# Patient Record
Sex: MALE | Race: WHITE | NOT HISPANIC OR LATINO | URBAN - METROPOLITAN AREA
[De-identification: names, ages, dates, MRNs, and addresses within clinical notes are randomized per-mention and may not be internally consistent; named-entity substitution may affect disease eponyms.]

---

## 2018-07-10 ENCOUNTER — AMBULATORY SURGICAL CENTER (OUTPATIENT)
Dept: URBAN - METROPOLITAN AREA SURGERY 20 | Facility: SURGERY | Age: 63
End: 2018-07-10
Payer: MEDICARE

## 2018-07-10 DIAGNOSIS — Z12.11 ENCOUNTER FOR SCREENING FOR MALIGNANT NEOPLASM OF COLON: ICD-10-CM

## 2018-07-10 DIAGNOSIS — K57.30 DIVERTICULOSIS OF LARGE INTESTINE WITHOUT PERFORATION OR ABS: ICD-10-CM

## 2018-07-10 PROCEDURE — G0121 COLON CA SCRN NOT HI RSK IND: HCPCS

## 2024-05-09 ENCOUNTER — OFFICE VISIT (OUTPATIENT)
Age: 69
End: 2024-05-09
Payer: MEDICARE

## 2024-05-09 VITALS
OXYGEN SATURATION: 97 % | BODY MASS INDEX: 28.71 KG/M2 | WEIGHT: 212 LBS | HEART RATE: 47 BPM | SYSTOLIC BLOOD PRESSURE: 148 MMHG | DIASTOLIC BLOOD PRESSURE: 90 MMHG | HEIGHT: 72 IN

## 2024-05-09 DIAGNOSIS — I71.21 ANEURYSM OF ASCENDING AORTA WITHOUT RUPTURE: ICD-10-CM

## 2024-05-09 DIAGNOSIS — I35.1 NONRHEUMATIC AORTIC VALVE INSUFFICIENCY: ICD-10-CM

## 2024-05-09 DIAGNOSIS — I35.0 NONRHEUMATIC AORTIC VALVE STENOSIS: Primary | ICD-10-CM

## 2024-05-09 DIAGNOSIS — I48.0 PAROXYSMAL ATRIAL FIBRILLATION: ICD-10-CM

## 2024-05-09 PROCEDURE — 99204 OFFICE O/P NEW MOD 45 MIN: CPT | Performed by: INTERNAL MEDICINE

## 2024-05-09 PROCEDURE — 93000 ELECTROCARDIOGRAM COMPLETE: CPT | Performed by: INTERNAL MEDICINE

## 2024-05-09 RX ORDER — METOPROLOL SUCCINATE 50 MG/1
50 TABLET, EXTENDED RELEASE ORAL
COMMUNITY
Start: 2024-03-25 | End: 2025-03-25

## 2024-05-09 RX ORDER — LOSARTAN POTASSIUM 100 MG/1
100 TABLET ORAL DAILY
Qty: 90 TABLET | Refills: 3 | Status: SHIPPED | OUTPATIENT
Start: 2024-05-09

## 2024-05-09 RX ORDER — LOSARTAN POTASSIUM 50 MG/1
50 TABLET ORAL DAILY
COMMUNITY
End: 2024-05-09 | Stop reason: SDUPTHER

## 2024-05-09 RX ORDER — ATORVASTATIN CALCIUM 80 MG/1
80 TABLET, FILM COATED ORAL DAILY
COMMUNITY

## 2024-05-09 RX ORDER — AMLODIPINE BESYLATE 5 MG/1
5 TABLET ORAL DAILY
COMMUNITY
Start: 2024-01-17 | End: 2025-01-18

## 2024-06-04 ENCOUNTER — TELEPHONE (OUTPATIENT)
Age: 69
End: 2024-06-04
Payer: MEDICARE

## 2024-06-11 RX ORDER — AMLODIPINE BESYLATE 2.5 MG/1
2.5 TABLET ORAL DAILY
Qty: 90 TABLET | Refills: 4 | Status: SHIPPED | OUTPATIENT
Start: 2024-06-11

## 2024-06-11 RX ORDER — METOPROLOL SUCCINATE 50 MG/1
50 TABLET, EXTENDED RELEASE ORAL 2 TIMES DAILY
Qty: 180 TABLET | Refills: 4 | Status: SHIPPED | OUTPATIENT
Start: 2024-06-11

## 2024-06-11 NOTE — TELEPHONE ENCOUNTER
Patient stopped by and I updated his list accordingly. His amlodipine was actually 2.5mg daily instead of 5mg daily and he was taking his metoprolol succ 50mg as twice daily instead of daily.    He wanted me to pass along that since he saw you on 5/9/24, that his BP has been running 120-130s/80s and HR 44-50 at home.    On 5/9/24 you increased his Losartan from 50mg daily to 100mg daily.    Please advise if you want to make any further medication changes.    Thanks,  Tayler

## 2024-08-05 ENCOUNTER — TELEPHONE (OUTPATIENT)
Dept: CARDIOLOGY | Facility: CLINIC | Age: 69
End: 2024-08-05
Payer: MEDICARE

## 2024-08-05 NOTE — TELEPHONE ENCOUNTER
Caller Name: Aldair Reich      Relationship: Self      Best Contact Number: [phone]      Patient is requesting samples of apixaban (ELIQUIS) 5 MG tablet tablet       How many days of medication do you have left? LITTLE LESS THAN A WEEK        Additional Information: PATIENT STATES [facility] IS HAVING A PROCEDURE TODAY AND CAN PICK THEM UP IF ANY AVAILABLE.

## 2024-09-09 ENCOUNTER — TELEPHONE (OUTPATIENT)
Dept: CARDIOLOGY | Facility: CLINIC | Age: 69
End: 2024-09-09
Payer: MEDICARE

## 2024-09-09 NOTE — TELEPHONE ENCOUNTER
Caller: KAILEY KILGORE    Relationship: [facility]    Best call back number: PLEASE CALL PATIENT [phone]        What was the call regarding: RECEIVED THE PROVIDER PORTION OF THE ASSISTANCE FORMS BUT THEY ARE STILL MISSING THE PATIENT SECTION    FAX: [phone]

## 2024-09-10 NOTE — TELEPHONE ENCOUNTER
I have received the fax from Perla England and scanned it into the chart.    I will reach out to the patient Friday when Dr. Mckeon is not in clinic and see if he is still interested.    Tayler

## 2024-11-20 ENCOUNTER — OFFICE VISIT (OUTPATIENT)
Age: 69
End: 2024-11-20
Payer: MEDICARE

## 2024-11-20 VITALS
BODY MASS INDEX: 29.8 KG/M2 | WEIGHT: 220 LBS | HEIGHT: 72 IN | DIASTOLIC BLOOD PRESSURE: 80 MMHG | SYSTOLIC BLOOD PRESSURE: 128 MMHG | HEART RATE: 55 BPM

## 2024-11-20 DIAGNOSIS — I35.0 NONRHEUMATIC AORTIC VALVE STENOSIS: Primary | ICD-10-CM

## 2024-11-20 DIAGNOSIS — I48.0 PAROXYSMAL ATRIAL FIBRILLATION: ICD-10-CM

## 2024-11-20 DIAGNOSIS — I35.1 NONRHEUMATIC AORTIC VALVE INSUFFICIENCY: ICD-10-CM

## 2024-11-20 DIAGNOSIS — I71.21 ANEURYSM OF ASCENDING AORTA WITHOUT RUPTURE: ICD-10-CM

## 2024-11-20 PROCEDURE — 99214 OFFICE O/P EST MOD 30 MIN: CPT | Performed by: INTERNAL MEDICINE

## 2024-11-20 PROCEDURE — 93000 ELECTROCARDIOGRAM COMPLETE: CPT | Performed by: INTERNAL MEDICINE

## 2024-11-20 NOTE — PROGRESS NOTES
Aldair Damir  1955  Date of Office Visit: 11/20/24  Encounter Provider: Eder Mckeon MD  Place of Service: [facility]IOLOG[facility]      CHIEF COMPLAINT:  Ascending aortic aneurysm  Coronary artery disease  Paroxysmal atrial fibrillation    HISTORY OF PRESENT ILLNESS:  69-year-old male with past medical history hypertension, hypercholesterolemia, TIA, and prediabetes, who previously was documented on stress test to have a normal nuclear scan but evidence of PVCs and nonsustained ventricular tachycardia.  He subsequently underwent evaluation including an event monitor showing paroxysmal atrial fibrillation with a rapid ventricular rate and in addition to that a PVC burden of 5%.  He did have nonsustained ventricular tachycardia with a 13 beat run.  He subsequently was set up for transthoracic echocardiogram which showed him to have normal left ventricular size and systolic function with mild aortic valve stenosis and mild to moderate aortic valve regurgitation.  He was also noted to have mild dilation of the aortic root and ascending aorta.  This was followed up on 2/20/2024 with a CTA of the chest abdomen.  He was noted to have a mildly dilated ascending aorta at 4.1 cm and mild dilation of the abdominal aorta at 3.1 cm.  He was subsequently started on Eliquis therapy for his paroxysmal atrial fibrillation and his Toprol was increased.  He has been doing well with that.  He denies any chest pain or dyspnea on exertion.  Secondary to his nonsustained ventricular tachycardia he was also scheduled for a cardiac catheterization.  He had a mid LAD stenosis of 75% and an apical LAD stenosis that was around 90%.  He states that at the time of his catheterization he was told that he needed a CTA, however he previously had this. Of note he does have a son who had an aortic valve replacement at age 21 and then had a TAVR more recently with Dr. Anthony.  He has been doing well  physically as of late.  He denies any dyspnea exertion, chest pain or orthopnea.  He has had no lightheadedness or syncope in the setting of his mild sinus bradycardia.  Unfortunately does suffer the loss of his brother with what sounds like an MI.        Review of Systems   Constitutional: Negative for fever and malaise/fatigue.   HENT:  Negative for nosebleeds and sore throat.    Eyes:  Negative for blurred vision and double vision.   Cardiovascular:  Negative for chest pain, claudication, palpitations and syncope.   Respiratory:  Negative for cough, shortness of breath and snoring.    Endocrine: Negative for cold intolerance, heat intolerance and polydipsia.   Skin:  Negative for itching, poor wound healing and rash.   Musculoskeletal:  Negative for joint pain, joint swelling, muscle weakness and myalgias.   Gastrointestinal:  Negative for abdominal pain, melena, nausea and vomiting.   Neurological:  Negative for light-headedness, loss of balance, seizures, vertigo and weakness.   Psychiatric/Behavioral:  Negative for altered mental status and depression.          Past Medical History:   Diagnosis Date    Abnormal ECG 02/2024    Afib and Vtac    Aneurysm 02/2024    Aortic aneurism    Arrhythmia 02/2024    Afib & Vtac    Atrial fibrillation 02/2024    Coronary artery disease 02/2024    Mild aortic stenosis    Heart valve disease 02/2024    Calcified aortic valve    Hyperlipidemia 02/2016    Hypertension 02/2024    150/90       The following portions of the patient's history were reviewed and updated as appropriate: Social history , Family history, and Surgical history     Current Outpatient Medications on File Prior to Visit   Medication Sig Dispense Refill    amLODIPine (NORVASC) 2.5 MG tablet Take 1 tablet by mouth Daily. 90 tablet 4    apixaban (ELIQUIS) 5 MG tablet tablet Take 1 tablet by mouth 2 (Two) Times a Day. 28 tablet 0    aspirin 81 MG oral suspension       atorvastatin (LIPITOR) 80 MG tablet Take 1  "tablet by mouth Daily.      losartan (COZAAR) 100 MG tablet Take 1 tablet by mouth Daily. 90 tablet 3    metoprolol succinate XL (TOPROL-XL) 50 MG 24 hr tablet Take 1 tablet by mouth 2 (Two) Times a Day. 180 tablet 4     No current facility-administered medications on file prior to visit.       Allergies   Allergen Reactions    Penicillins Irritability     Last time pt had PCN was when he was 17. He said he turned red.       Vitals:    11/20/24 0941   BP: 128/80   Pulse: 55   Weight: 99.8 kg (220 lb)   Height: 182.9 cm (72\")     Body mass index is 29.84 kg/m².   Constitutional:       Appearance: Well-developed.   Eyes:      General: No scleral icterus.     Conjunctiva/sclera: Conjunctivae normal.   HENT:      Head: Normocephalic and atraumatic.   Neck:      Thyroid: No thyromegaly.      Vascular: Normal carotid pulses. No carotid bruit, hepatojugular reflux or JVD.      Trachea: No tracheal deviation.   Pulmonary:      Effort: No respiratory distress.      Breath sounds: Normal breath sounds. No decreased breath sounds. No wheezing. No rhonchi. No rales.   Chest:      Chest wall: Not tender to palpatation.   Cardiovascular:      Bradycardia present. Regular rhythm.      No gallop.    Pulses:     Carotid: 2+ bilaterally.     Radial: 2+ bilaterally.     Femoral: 2+ bilaterally.     Dorsalis pedis: 2+ bilaterally.     Posterior tibial: 2+ bilaterally.  Edema:     Peripheral edema absent.   Abdominal:      General: Bowel sounds are normal. There is no distension.      Palpations: Abdomen is soft.      Tenderness: There is no abdominal tenderness.   Musculoskeletal:         General: No deformity.      Cervical back: Normal range of motion and neck supple. Skin:     Findings: No erythema or rash.   Neurological:      Mental Status: Alert and oriented to person, place, and time.      Sensory: No sensory deficit.   Psychiatric:         Behavior: Behavior normal.           Lab Results   Component Value Date    WBC 5.65 " 10/04/2021    HGB 16.3 10/04/2021    HCT 50.2 10/04/2021    MCV 81.4 10/04/2021     10/04/2021       Lab Results   Component Value Date    BUN 17 10/04/2021    CREATININE 1.11 10/04/2021    BCR 15.3 10/04/2021    K 4.0 10/04/2021    CO2 19 (L) 10/04/2021    CALCIUM 9.3 10/04/2021    ALBUMIN 4.2 10/04/2021    BILITOT 0.7 10/04/2021    AST 22 10/04/2021    ALT 34 10/04/2021       Lab Results   Component Value Date    CALCIUM 9.3 10/04/2021     10/04/2021    K 4.0 10/04/2021    CO2 19 (L) 10/04/2021     (H) 10/04/2021    BUN 17 10/04/2021    CREATININE 1.11 10/04/2021    BCR 15.3 10/04/2021    ANIONGAP 11 10/04/2021       Lab Results   Component Value Date    CHLPL 106 02/28/2024    TRIG 105 02/28/2024    HDL 41 02/28/2024         ECG 12 Lead    Date/Time: 11/20/2024 10:51 AM  Performed by: Eder Mckeon MD    Authorized by: Eder Mckeon MD  Comparison: compared with previous ECG from 5/9/2024  Similar to previous ECG  Rhythm: sinus bradycardia  Rate: bradycardic  Conduction: 1st degree AV block  QRS axis: normal           Cardiac catheterization done on 3/1/2024 shows:  1. Mild aortic stenosis aortic valve area 3.0 cm²  2. Moderate to severe aortic regurgitation  3. Aneurysmal dilatation of ascending aorta  4. Normal right heart pressures  5. Left interior descending artery 75% mid section stenosis, 90% distal one third stenosis near the apex  6. 40-50% stenosis first marginal branch of circumflex    2/20/24 CTA abdomen  IMPRESSION:   Mildly aneurysmal abdominal aorta measuring 3.1 cm.     2/20/24 CTA chest   IMPRESSION:   1.  Mildly dilated thoracic aorta measuring up to 4.1 cm at the ascending tubular segment.   2.  Incidental and chronic appearing findings as above.       January 2024:TTE  LVEF of 58%, mild concentric left ventricular hypertrophy, grade 1 diastolic dysfunction, mildly dilated left atrium, mildly dilated right ventricle, mild aortic stenosis, mild to moderate  aortic regurgitation, mild mitral regurgitation, mild tricuspid regurgitation, mild dilatation of the aortic root and the ascending aorta.    1/2024: Event monitor  Normal sinus rhythm, no pauses, 4274 runs of atrial fibrillation with rapid ventricular response, longest A-fib run was 42 beats. PVC burden of 5.04%. 761 runs of ventricular tachycardia. Longest VT run was a 13 beat run of ventricular tachycardia.    DISCUSSION/SUMMARY  69-year-old with a medical history of ascending aortic aneurysm measuring 4.1 cm, mild aortic valve stenosis, mild to moderate aortic valve regurgitation, paroxysmal atrial fibrillation, PVCs and nonsustained ventricular tachycardia, mildly dilated abdominal aorta, and coronary artery disease who presents to me in follow-up.  He is doing very well as of late.  He denies any chest pain or dyspnea on exertion.  Blood pressure is better controlled as of late.  He does have a mild sinus bradycardia but is asymptomatic with that.    1.  Ascending aortic aneurysm/mildly dilated abdominal aorta  - I would plan on a repeat CTA on next visit.  - Blood pressure control looks improved.    2.  Paroxysmal atrial fibrillation: Seems to be doing well and is asymptomatic.  Continue apixaban 5 mg p.o. every 12 hours.  Tolerating well with no bleeding complications.  - Continue Toprol 50 mg p.o. every 12 hours.  Mild sinus bradycardia but asymptomatic    3.  Coronary artery disease: Previously documented on reported to have a 75% mid LAD stenosis and apical 90% stenosis.  No indication for intervention at this time.  He seems to be doing fine on medical management.    4.  Essential hypertension: Improved compared to last visit.

## 2025-01-08 ENCOUNTER — TELEPHONE (OUTPATIENT)
Dept: CARDIOLOGY | Facility: CLINIC | Age: 70
End: 2025-01-08
Payer: MEDICARE

## 2025-01-08 NOTE — TELEPHONE ENCOUNTER
Caller Name: Aldair Reich      Relationship: Self      Best Contact Number: [phone]      Patient is requesting samples of apixaban (ELIQUIS) 5 MG tablet tablet       How many days of medication do you have left? ENOUGH FOR TODAY         Additional Information: WIFE WILL  SAMPLES IF ANY ARE AVAILABLE. CALL IF NO SAMPLES ARE AVAILABLE.

## 2025-03-26 ENCOUNTER — TELEPHONE (OUTPATIENT)
Dept: CARDIOLOGY | Age: 70
End: 2025-03-26

## 2025-03-26 NOTE — TELEPHONE ENCOUNTER
Caller Name: Aldair Reich      Relationship: Self      Best Contact Number: [phone]      Patient is requesting samples of: ELIQUIS 5MG

## 2025-04-21 ENCOUNTER — TELEPHONE (OUTPATIENT)
Dept: CARDIOLOGY | Age: 70
End: 2025-04-21
Payer: MEDICARE

## 2025-04-21 NOTE — TELEPHONE ENCOUNTER
Caller: Aldair Reich    Relationship: Self    Best call back number:       PATIENTS IS REQUESTING SAMPLES OF ELIQUIS

## 2025-05-20 RX ORDER — LOSARTAN POTASSIUM 100 MG/1
100 TABLET ORAL DAILY
Qty: 90 TABLET | Refills: 3 | Status: SHIPPED | OUTPATIENT
Start: 2025-05-20

## 2025-06-02 ENCOUNTER — LAB (OUTPATIENT)
Dept: LAB | Facility: HOSPITAL | Age: 70
End: 2025-06-02
Payer: MEDICARE

## 2025-06-02 ENCOUNTER — OFFICE VISIT (OUTPATIENT)
Age: 70
End: 2025-06-02
Payer: MEDICARE

## 2025-06-02 VITALS
HEART RATE: 47 BPM | BODY MASS INDEX: 30.48 KG/M2 | HEIGHT: 72 IN | WEIGHT: 225 LBS | DIASTOLIC BLOOD PRESSURE: 78 MMHG | SYSTOLIC BLOOD PRESSURE: 122 MMHG

## 2025-06-02 DIAGNOSIS — I35.0 NONRHEUMATIC AORTIC VALVE STENOSIS: ICD-10-CM

## 2025-06-02 DIAGNOSIS — Z13.1 ENCOUNTER FOR SCREENING FOR DIABETES MELLITUS: ICD-10-CM

## 2025-06-02 DIAGNOSIS — I10 PRIMARY HYPERTENSION: ICD-10-CM

## 2025-06-02 DIAGNOSIS — I25.10 CORONARY ARTERY DISEASE INVOLVING NATIVE CORONARY ARTERY OF NATIVE HEART WITHOUT ANGINA PECTORIS: ICD-10-CM

## 2025-06-02 DIAGNOSIS — I71.21 ANEURYSM OF ASCENDING AORTA WITHOUT RUPTURE: Primary | ICD-10-CM

## 2025-06-02 DIAGNOSIS — I35.1 NONRHEUMATIC AORTIC VALVE INSUFFICIENCY: ICD-10-CM

## 2025-06-02 DIAGNOSIS — E78.5 HYPERLIPIDEMIA, UNSPECIFIED HYPERLIPIDEMIA TYPE: ICD-10-CM

## 2025-06-02 DIAGNOSIS — I48.0 PAROXYSMAL ATRIAL FIBRILLATION: ICD-10-CM

## 2025-06-02 LAB
ANION GAP SERPL CALCULATED.3IONS-SCNC: 9 MMOL/L (ref 5–15)
BUN SERPL-MCNC: 13 MG/DL (ref 8–23)
BUN/CREAT SERPL: 11.7 (ref 7–25)
CALCIUM SPEC-SCNC: 9.6 MG/DL (ref 8.6–10.5)
CHLORIDE SERPL-SCNC: 107 MMOL/L (ref 98–107)
CHOLEST SERPL-MCNC: 105 MG/DL (ref 0–200)
CO2 SERPL-SCNC: 24 MMOL/L (ref 22–29)
CREAT SERPL-MCNC: 1.11 MG/DL (ref 0.76–1.27)
DEPRECATED RDW RBC AUTO: 39.5 FL (ref 37–54)
EGFRCR SERPLBLD CKD-EPI 2021: 70.6 ML/MIN/1.73
ERYTHROCYTE [DISTWIDTH] IN BLOOD BY AUTOMATED COUNT: 13.4 % (ref 12.3–15.4)
GLUCOSE SERPL-MCNC: 105 MG/DL (ref 65–99)
HBA1C MFR BLD: 6.1 % (ref 4.8–5.6)
HCT VFR BLD AUTO: 48 % (ref 37.5–51)
HDLC SERPL-MCNC: 35 MG/DL (ref 40–60)
HGB BLD-MCNC: 15.5 G/DL (ref 13–17.7)
LDLC SERPL CALC-MCNC: 45 MG/DL (ref 0–100)
LDLC/HDLC SERPL: 1.19 {RATIO}
MCH RBC QN AUTO: 26.8 PG (ref 26.6–33)
MCHC RBC AUTO-ENTMCNC: 32.3 G/DL (ref 31.5–35.7)
MCV RBC AUTO: 82.9 FL (ref 79–97)
PLATELET # BLD AUTO: 185 10*3/MM3 (ref 140–450)
PMV BLD AUTO: 10.9 FL (ref 6–12)
POTASSIUM SERPL-SCNC: 4.6 MMOL/L (ref 3.5–5.2)
RBC # BLD AUTO: 5.79 10*6/MM3 (ref 4.14–5.8)
SODIUM SERPL-SCNC: 140 MMOL/L (ref 136–145)
TRIGL SERPL-MCNC: 141 MG/DL (ref 0–150)
VLDLC SERPL-MCNC: 25 MG/DL (ref 5–40)
WBC NRBC COR # BLD AUTO: 5.7 10*3/MM3 (ref 3.4–10.8)

## 2025-06-02 PROCEDURE — 1159F MED LIST DOCD IN RCRD: CPT | Performed by: NURSE PRACTITIONER

## 2025-06-02 PROCEDURE — 93000 ELECTROCARDIOGRAM COMPLETE: CPT | Performed by: NURSE PRACTITIONER

## 2025-06-02 PROCEDURE — 3078F DIAST BP <80 MM HG: CPT | Performed by: NURSE PRACTITIONER

## 2025-06-02 PROCEDURE — 83695 ASSAY OF LIPOPROTEIN(A): CPT

## 2025-06-02 PROCEDURE — 85027 COMPLETE CBC AUTOMATED: CPT

## 2025-06-02 PROCEDURE — 36415 COLL VENOUS BLD VENIPUNCTURE: CPT | Performed by: NURSE PRACTITIONER

## 2025-06-02 PROCEDURE — 80048 BASIC METABOLIC PNL TOTAL CA: CPT

## 2025-06-02 PROCEDURE — 3074F SYST BP LT 130 MM HG: CPT | Performed by: NURSE PRACTITIONER

## 2025-06-02 PROCEDURE — 99214 OFFICE O/P EST MOD 30 MIN: CPT | Performed by: NURSE PRACTITIONER

## 2025-06-02 PROCEDURE — 1160F RVW MEDS BY RX/DR IN RCRD: CPT | Performed by: NURSE PRACTITIONER

## 2025-06-02 PROCEDURE — 82172 ASSAY OF APOLIPOPROTEIN: CPT | Performed by: NURSE PRACTITIONER

## 2025-06-02 PROCEDURE — 80061 LIPID PANEL: CPT

## 2025-06-02 PROCEDURE — 83036 HEMOGLOBIN GLYCOSYLATED A1C: CPT

## 2025-06-02 NOTE — PROGRESS NOTES
Date of Office Visit: 2025  Encounter Provider: SARAH Wesley  Place of Service: [facility] CARDIOLOG[facility]  Patient Name: Aldair Reich  :1955    Chief Complaint   Patient presents with    Nonrheumatic aortic valve stenosis     6 month follow up     :     HPI: Aldair Reich is a 70 y.o. male patient of Dr. Mckeon's with hypertension, hyperlipidemia, aortic stenosis, coronary artery disease, and paroxysmal atrial fibrillation.  He also has a mildly dilated ascending aorta.  Of note, his son had an aortic valve replacement at age 21.    In 2024, he underwent a cardiac catheterization at an outlying facility demonstrating a 75% mid LAD, 90% apical LAD, and a 40-50% first marginal branch for which medical therapy was recommended.  He also had mild aortic stenosis, moderate to severe aortic regurgitation, and aneurysmal dilation of the ascending aorta.    He was last seen in the office by Dr. Mckeon in 2024 at which time he was doing well.  His brother had recently passed away from what sounds like an MI.  Dr. Mckeon recommended repeating a CTA of the chest in 1 year.  Otherwise, no changes were made to his regimen.    He has been doing well.  He denies any chest pain, shortness of breath, palpitations, edema, dizziness, syncope, bleeding difficulties or melena.  He walks regularly for exercise.    Past Medical History:   Diagnosis Date    Abnormal ECG 2024    Afib and Vtac    Aneurysm 2024    Aortic aneurism    Arrhythmia 2024    Afib & Vtac    Atrial fibrillation 2024    Coronary artery disease 2024    Mild aortic stenosis    Heart valve disease 2024    Calcified aortic valve    Hyperlipidemia 2016    Hypertension 2024    150/90       Past Surgical History:   Procedure Laterality Date    CARDIAC CATHETERIZATION  2024       Social History     Socioeconomic History    Marital status:    Tobacco Use    Smoking  "status: Never    Smokeless tobacco: Never   Vaping Use    Vaping status: Never Used   Substance and Sexual Activity    Alcohol use: Not Currently    Drug use: Never    Sexual activity: Not Currently     Partners: Female     Birth control/protection: None       Family History   Problem Relation Age of Onset    No Known Problems Mother     Heart attack Father     Hyperlipidemia Father     Heart attack Brother     Heart failure Brother     Heart attack Brother     Bicuspid aortic valve Son 21        AVR    Aortic stenosis Son 32        TAVR       Review of Systems   Constitutional: Negative.   Cardiovascular: Negative.  Negative for chest pain, dyspnea on exertion, leg swelling, orthopnea, paroxysmal nocturnal dyspnea and syncope.   Respiratory: Negative.     Hematologic/Lymphatic: Negative for bleeding problem.   Musculoskeletal:  Negative for falls.   Gastrointestinal:  Negative for melena.   Neurological:  Negative for dizziness and light-headedness.       Allergies   Allergen Reactions    Penicillins Irritability     Last time pt had PCN was when he was 17. He said he turned red.         Current Outpatient Medications:     amLODIPine (NORVASC) 2.5 MG tablet, Take 1 tablet by mouth Daily., Disp: 90 tablet, Rfl: 4    apixaban (ELIQUIS) 5 MG tablet tablet, Take 1 tablet by mouth 2 (Two) Times a Day., Disp: 28 tablet, Rfl: 0    aspirin 81 MG oral suspension, , Disp: , Rfl:     atorvastatin (LIPITOR) 80 MG tablet, Take 1 tablet by mouth Daily., Disp: , Rfl:     losartan (COZAAR) 100 MG tablet, TAKE 1 TABLET BY MOUTH EVERY DAY, Disp: 90 tablet, Rfl: 3    metoprolol succinate XL (TOPROL-XL) 50 MG 24 hr tablet, Take 1 tablet by mouth 2 (Two) Times a Day., Disp: 180 tablet, Rfl: 4      Objective:     Vitals:    06/02/25 0919   BP: 122/78   Pulse: (!) 47   Weight: 102 kg (225 lb)   Height: 182.9 cm (72.01\")     Body mass index is 30.51 kg/m².    PHYSICAL EXAM:    Neck:      Vascular: No JVD.   Pulmonary:      Effort: " Pulmonary effort is normal.      Breath sounds: Normal breath sounds.   Cardiovascular:      Normal rate. Regular rhythm.      Murmurs: There is a harsh midsystolic murmur at the URSB, radiating to the neck.      No gallop.  No click. No rub.   Pulses:     Intact distal pulses.           ECG 12 Lead    Date/Time: 6/2/2025 9:33 AM  Performed by: Lynsey Kate APRN    Authorized by: Lynsey Kate APRN  Comparison: compared with previous ECG from 11/20/2024  Similar to previous ECG  Rhythm: sinus bradycardia  Rate: bradycardic  BPM: 47  T inversion: II, III, V1 and V3  Other findings: non-specific ST-T wave changes            Assessment:       Diagnosis Plan   1. Aneurysm of ascending aorta without rupture  CT Angiogram Chest      2. Paroxysmal atrial fibrillation  ECG 12 Lead      3. Nonrheumatic aortic valve stenosis  Adult Transthoracic Echo Complete W/ Cont if Necessary Per Protocol      4. Nonrheumatic aortic valve insufficiency  Adult Transthoracic Echo Complete W/ Cont if Necessary Per Protocol      5. Coronary artery disease involving native coronary artery of native heart without angina pectoris  Apolipoprotein B    Lipoprotein A (LPA)    Lipid Panel    CBC (No Diff)    Basic Metabolic Panel    Hemoglobin A1c      6. Primary hypertension  Hemoglobin A1c      7. Hyperlipidemia, unspecified hyperlipidemia type  Apolipoprotein B    Lipoprotein A (LPA)    Lipid Panel    Hemoglobin A1c      8. Encounter for screening for diabetes mellitus  Hemoglobin A1c        Orders Placed This Encounter   Procedures    CT Angiogram Chest     Standing Status:   Future     Expected Date:   6/9/2025     Release to patient:   Routine Release [[id number]]     Reason for Exam::   ascending aortic aneurysm    Apolipoprotein B     Release to patient:   Routine Release [[id number]]    Lipoprotein A (LPA)     Standing Status:   Future     Expected Date:   6/7/2025     Expiration Date:   6/2/2026     Release to patient:    Routine Release [[id number]]    Lipid Panel     Standing Status:   Future     Expected Date:   6/7/2025     Expiration Date:   6/2/2026     Release to patient:   Routine Release [[id number]]    CBC (No Diff)     Standing Status:   Future     Expected Date:   6/7/2025     Expiration Date:   6/2/2026     Release to patient:   Routine Release [[id number]]    Basic Metabolic Panel     Standing Status:   Future     Expected Date:   6/9/2025     Expiration Date:   6/2/2026     Release to patient:   Routine Release [[id number]]    Hemoglobin A1c     Standing Status:   Future     Expected Date:   6/2/2025     Expiration Date:   6/2/2026     Release to patient:   Routine Release [[id number]]    ECG 12 Lead     This order was created via procedure documentation     Release to patient:   Routine Release [[id number]]    Adult Transthoracic Echo Complete W/ Cont if Necessary Per Protocol     Standing Status:   Future     Expected Date:   6/9/2025     Expiration Date:   6/2/2026     Reason for exam?:   Valvular Function     Valvular Function specification?:   Native Valvular Regurg     Valvular Function specification?:   Native Valvular Stenosis     Release to patient:   Routine Release [[id number]]          Plan:       1.  Ascending aortic aneurysm.  CTA of the chest has been ordered.      2.  Paroxysmal atrial fibrillation.  His [id number] score is 3 and he is appropriately anticoagulated with Eliquis.  He is rate controlled with Toprol.      3.  Aortic stenosis/aortic regurgitation.  Last year he was noted to have mild aortic stenosis and moderate to severe aortic regurgitation.  Repeat echocardiogram has been ordered.      4.  Coronary artery disease.  He denies any symptoms of angina.  Continue medical therapy including aspirin and atorvastatin.      5.  Hypertension.  His blood pressure looks great.  Continue amlodipine, losartan, and Toprol.      6.  Hyperlipidemia.  He is on atorvastatin 80 mg.  Recommended a lipid  panel and lipid particles.      Overall I think he is doing well.  He has not seen a primary care doctor recently.  I recommended checking baseline labs today.  I will call him with the results of the above.  Otherwise, he will follow-up with Dr. Mckeon in 1 year.      As always, it has been a pleasure to participate in your patient's care.      Sincerely,         SARAH Damico

## 2025-06-03 LAB — LPA SERPL-SCNC: 98.7 NMOL/L

## 2025-06-04 LAB — APO B SERPL-MCNC: 55 MG/DL

## 2025-06-09 ENCOUNTER — HOSPITAL ENCOUNTER (OUTPATIENT)
Dept: CARDIOLOGY | Facility: HOSPITAL | Age: 70
Discharge: HOME | End: 2025-06-09
Admitting: NURSE PRACTITIONER
Payer: MEDICARE

## 2025-06-09 VITALS
DIASTOLIC BLOOD PRESSURE: 70 MMHG | SYSTOLIC BLOOD PRESSURE: 118 MMHG | HEIGHT: 72 IN | WEIGHT: 225 LBS | BODY MASS INDEX: 30.48 KG/M2

## 2025-06-09 DIAGNOSIS — I35.1 NONRHEUMATIC AORTIC VALVE INSUFFICIENCY: ICD-10-CM

## 2025-06-09 DIAGNOSIS — I35.0 NONRHEUMATIC AORTIC VALVE STENOSIS: ICD-10-CM

## 2025-06-09 LAB
AORTIC ARCH: 3.5 CM
AORTIC DIMENSIONLESS INDEX: 0.3 (DI)
ASCENDING AORTA: 4.2 CM
AV MEAN PRESS GRAD SYS DOP V1V2: 16.9 MMHG
AV VMAX SYS DOP: 281.2 CM/SEC
BH CV ECHO MEAS - ACS: 1.3 CM
BH CV ECHO MEAS - AI P1/2T: 1097 MSEC
BH CV ECHO MEAS - AO MAX PG: 31.6 MMHG
BH CV ECHO MEAS - AO ROOT DIAM: 4.2 CM
BH CV ECHO MEAS - AO V2 VTI: 71.5 CM
BH CV ECHO MEAS - AVA(I,D): 0.94 CM2
BH CV ECHO MEAS - EDV(CUBED): 208.7 ML
BH CV ECHO MEAS - EDV(MOD-SP2): 111 ML
BH CV ECHO MEAS - EDV(MOD-SP4): 118 ML
BH CV ECHO MEAS - EF(MOD-SP2): 64.9 %
BH CV ECHO MEAS - EF(MOD-SP4): 62.7 %
BH CV ECHO MEAS - ESV(CUBED): 54.8 ML
BH CV ECHO MEAS - ESV(MOD-SP2): 39 ML
BH CV ECHO MEAS - ESV(MOD-SP4): 44 ML
BH CV ECHO MEAS - FS: 36 %
BH CV ECHO MEAS - IVS/LVPW: 1.01 CM
BH CV ECHO MEAS - IVSD: 1.08 CM
BH CV ECHO MEAS - LAT PEAK E' VEL: 6 CM/SEC
BH CV ECHO MEAS - LV DIASTOLIC VOL/BSA (35-75): 52.7 CM2
BH CV ECHO MEAS - LV MASS(C)D: 265.2 GRAMS
BH CV ECHO MEAS - LV MAX PG: 2.46 MMHG
BH CV ECHO MEAS - LV MEAN PG: 1.42 MMHG
BH CV ECHO MEAS - LV SYSTOLIC VOL/BSA (12-30): 19.6 CM2
BH CV ECHO MEAS - LV V1 MAX: 78.4 CM/SEC
BH CV ECHO MEAS - LV V1 VTI: 21.8 CM
BH CV ECHO MEAS - LVIDD: 5.9 CM
BH CV ECHO MEAS - LVIDS: 3.8 CM
BH CV ECHO MEAS - LVOT AREA: 3.1 CM2
BH CV ECHO MEAS - LVOT DIAM: 1.98 CM
BH CV ECHO MEAS - LVPWD: 1.07 CM
BH CV ECHO MEAS - MED PEAK E' VEL: 4.8 CM/SEC
BH CV ECHO MEAS - MV A DUR: 0.13 SEC
BH CV ECHO MEAS - MV A MAX VEL: 71.1 CM/SEC
BH CV ECHO MEAS - MV DEC SLOPE: 307.4 CM/SEC2
BH CV ECHO MEAS - MV DEC TIME: 0.26 SEC
BH CV ECHO MEAS - MV E MAX VEL: 52.7 CM/SEC
BH CV ECHO MEAS - MV E/A: 0.74
BH CV ECHO MEAS - MV MAX PG: 2.5 MMHG
BH CV ECHO MEAS - MV MEAN PG: 0.8 MMHG
BH CV ECHO MEAS - MV P1/2T: 81.7 MSEC
BH CV ECHO MEAS - MV V2 VTI: 29.8 CM
BH CV ECHO MEAS - MVA(P1/2T): 2.7 CM2
BH CV ECHO MEAS - MVA(VTI): 2.27 CM2
BH CV ECHO MEAS - PA ACC TIME: 0.14 SEC
BH CV ECHO MEAS - PA V2 MAX: 93.7 CM/SEC
BH CV ECHO MEAS - PULM A REVS DUR: 0.14 SEC
BH CV ECHO MEAS - PULM A REVS VEL: 19.6 CM/SEC
BH CV ECHO MEAS - PULM DIAS VEL: 46.3 CM/SEC
BH CV ECHO MEAS - PULM S/D: 1.23
BH CV ECHO MEAS - PULM SYS VEL: 57 CM/SEC
BH CV ECHO MEAS - RV MAX PG: 0.56 MMHG
BH CV ECHO MEAS - RV V1 MAX: 37.3 CM/SEC
BH CV ECHO MEAS - RV V1 VTI: 9.8 CM
BH CV ECHO MEAS - SV(LVOT): 67.4 ML
BH CV ECHO MEAS - SV(MOD-SP2): 72 ML
BH CV ECHO MEAS - SV(MOD-SP4): 74 ML
BH CV ECHO MEAS - SVI(LVOT): 30.1 ML/M2
BH CV ECHO MEAS - SVI(MOD-SP2): 32.1 ML/M2
BH CV ECHO MEAS - SVI(MOD-SP4): 33 ML/M2
BH CV ECHO MEAS - TAPSE (>1.6): 1.65 CM
BH CV ECHO MEASUREMENTS AVERAGE E/E' RATIO: 9.76
BH CV XLRA - RV BASE: 3.4 CM
BH CV XLRA - RV LENGTH: 7.1 CM
BH CV XLRA - RV MID: 3.4 CM
BH CV XLRA - TDI S': 8.6 CM/SEC
LEFT ATRIUM VOLUME INDEX: 25.1 ML/M2
LV EF BIPLANE MOD: 64.6 %
SINUS: 4.1 CM
STJ: 3.9 CM

## 2025-06-09 PROCEDURE — 93306 TTE W/DOPPLER COMPLETE: CPT | Performed by: INTERNAL MEDICINE

## 2025-06-09 PROCEDURE — 93306 TTE W/DOPPLER COMPLETE: CPT

## 2025-06-17 RX ORDER — LOSARTAN POTASSIUM 100 MG/1
100 TABLET ORAL DAILY
Qty: 90 TABLET | Refills: 3 | Status: SHIPPED | OUTPATIENT
Start: 2025-06-17

## 2025-06-18 ENCOUNTER — PATIENT ROUNDING (BHMG ONLY) (OUTPATIENT)
Dept: CARDIOLOGY | Facility: HOSPITAL | Age: 70
End: 2025-06-18
Payer: MEDICARE

## 2025-06-23 RX ORDER — METOPROLOL SUCCINATE 50 MG/1
50 TABLET, EXTENDED RELEASE ORAL 2 TIMES DAILY
Qty: 180 TABLET | Refills: 3 | Status: SHIPPED | OUTPATIENT
Start: 2025-06-23

## 2025-07-08 RX ORDER — ATORVASTATIN CALCIUM 80 MG/1
80 TABLET, FILM COATED ORAL DAILY
Qty: 90 TABLET | Refills: 3 | Status: SHIPPED | OUTPATIENT
Start: 2025-07-08

## 2025-07-14 ENCOUNTER — HOSPITAL ENCOUNTER (OUTPATIENT)
Dept: CT IMAGING | Facility: HOSPITAL | Age: 70
Discharge: HOME | End: 2025-07-14
Admitting: NURSE PRACTITIONER
Payer: MEDICARE

## 2025-07-14 DIAGNOSIS — I71.21 ANEURYSM OF ASCENDING AORTA WITHOUT RUPTURE: ICD-10-CM

## 2025-07-14 PROCEDURE — 25510000001 IOPAMIDOL PER 1 ML: Performed by: NURSE PRACTITIONER

## 2025-07-14 PROCEDURE — 71275 CT ANGIOGRAPHY CHEST: CPT

## 2025-07-14 RX ORDER — IOPAMIDOL 755 MG/ML
100 INJECTION, SOLUTION INTRAVASCULAR
Status: COMPLETED | OUTPATIENT
Start: 2025-07-14 | End: 2025-07-14

## 2025-07-14 RX ADMIN — IOPAMIDOL 95 ML: 755 INJECTION, SOLUTION INTRAVENOUS at 09:18

## 2025-07-18 RX ORDER — AMLODIPINE BESYLATE 2.5 MG/1
2.5 TABLET ORAL DAILY
Qty: 90 TABLET | Refills: 2 | Status: SHIPPED | OUTPATIENT
Start: 2025-07-18

## 2025-07-22 ENCOUNTER — TELEPHONE (OUTPATIENT)
Dept: INTERNAL MEDICINE | Facility: CLINIC | Age: 70
End: 2025-07-22